# Patient Record
Sex: FEMALE | Race: OTHER | HISPANIC OR LATINO
[De-identification: names, ages, dates, MRNs, and addresses within clinical notes are randomized per-mention and may not be internally consistent; named-entity substitution may affect disease eponyms.]

---

## 2021-04-19 PROBLEM — Z00.00 ENCOUNTER FOR PREVENTIVE HEALTH EXAMINATION: Status: ACTIVE | Noted: 2021-04-19

## 2021-04-29 PROBLEM — Z78.9 NO FAMILY HISTORY OF CANCER: Status: ACTIVE | Noted: 2021-04-29

## 2021-04-29 PROBLEM — Z78.9 RARELY CONSUMES ALCOHOL: Status: ACTIVE | Noted: 2021-04-29

## 2021-04-30 ENCOUNTER — RESULT REVIEW (OUTPATIENT)
Age: 26
End: 2021-04-30

## 2021-04-30 ENCOUNTER — APPOINTMENT (OUTPATIENT)
Dept: HEMATOLOGY ONCOLOGY | Facility: CLINIC | Age: 26
End: 2021-04-30
Payer: COMMERCIAL

## 2021-04-30 VITALS
HEIGHT: 61 IN | DIASTOLIC BLOOD PRESSURE: 86 MMHG | TEMPERATURE: 97.5 F | BODY MASS INDEX: 27.56 KG/M2 | OXYGEN SATURATION: 97 % | HEART RATE: 95 BPM | SYSTOLIC BLOOD PRESSURE: 129 MMHG | RESPIRATION RATE: 18 BRPM | WEIGHT: 146 LBS

## 2021-04-30 DIAGNOSIS — Z78.9 OTHER SPECIFIED HEALTH STATUS: ICD-10-CM

## 2021-04-30 DIAGNOSIS — Z71.89 OTHER SPECIFIED COUNSELING: ICD-10-CM

## 2021-04-30 DIAGNOSIS — U07.1 COVID-19: ICD-10-CM

## 2021-04-30 PROCEDURE — 99215 OFFICE O/P EST HI 40 MIN: CPT

## 2021-04-30 PROCEDURE — 99072 ADDL SUPL MATRL&STAF TM PHE: CPT

## 2021-05-03 PROBLEM — U07.1 COVID-19 VIRUS INFECTION: Status: ACTIVE | Noted: 2021-05-03

## 2021-05-03 PROBLEM — Z71.89 ADVICE GIVEN ABOUT COVID-19 VIRUS INFECTION: Status: ACTIVE | Noted: 2021-05-03

## 2021-05-03 NOTE — HISTORY OF PRESENT ILLNESS
[de-identified] : Mr. Ally Lynn is 25 year old female with no past medical or surgery history, never smoked, not on any contraception, or recent travel  presented to [facility] on 4/14/21 for hemoptysis, worsening of breath, right lower lobe pain (worse with laying down) and found to have right lower lobe PE.\par 4/14/21 CTA - Acute emboli involving lobar and segmental branches at the right lower lobe.\par Consolidation at the right lung base with a small right pleural effusion. Findings concerning for right lower lobe pneumonia. As noted that a component of pulmonary infarction within this region cannot be entirely excluded as the vessel containing emboli does extend to the right lung base.\par \par A few weeks prior to this admission, she's exposed to a positive Covid co-worker with neg Covid PCR at the time. She's found to have positive Covid antibodies and D dimer 509 during this admission. \par \par Patient with no prior pregnancy, no family history of hematological and/or oncological disorder or miscarriages.\par She was started on Lovenox and discharged home with Eliquis 5 mg bid.  [de-identified] : Patient is seen today for follow up\par \par Her breathing is much better\par Still has pleuritic right chest pain, which is getting better\par Hemoptysis resolved now\par \par She is on eliquis 5 mg BID\par LMP 4/25/21- lasted 3 days\par \par Not on Birth control\par \par No family ho blood clots

## 2021-05-03 NOTE — ASSESSMENT
[FreeTextEntry1] : Acute emboli involving lobar and segmental branches at the right lower lobe\par Not on birth control pills at the time of diagnosis\par No prior personal of family ho VTE or miscarriages\par COVID antibody positive\par \par I believe her PE was secondary to COVID 19 infection\par She is on eliquis 5 mg BID and  tolerating well\par CLinically gradually getting better\par Labs reviewed, analyzed and discussed\par D Dimer getting better\par Given her young age at diagnosis, recommend thrombophilia work up\par Discussed about various thrombophilias such as Factor V Leiden mutation, Prothrombin Gene mutation, APS panel, Protein S, protein C, AT3\par To be sent today\par Advised to avoid Birth control pills for now\par Recommend 3-6 months of anticoagulation, depending on how she is feeling/recovering\par Signs, symptoms. precautions for VTE discussed\par \par COVID 19\par Positive AB\par Clinically feels back to normal\par Recommend vaccination when able to\par \par Patient had multiple questions which were answered to satisfaction\par  \par FOllow up in a few weeks to review findings

## 2021-05-20 ENCOUNTER — APPOINTMENT (OUTPATIENT)
Dept: HEMATOLOGY ONCOLOGY | Facility: CLINIC | Age: 26
End: 2021-05-20
Payer: COMMERCIAL

## 2021-05-20 VITALS
OXYGEN SATURATION: 99 % | DIASTOLIC BLOOD PRESSURE: 80 MMHG | RESPIRATION RATE: 18 BRPM | HEART RATE: 70 BPM | BODY MASS INDEX: 27.19 KG/M2 | TEMPERATURE: 98.1 F | HEIGHT: 61 IN | SYSTOLIC BLOOD PRESSURE: 134 MMHG | WEIGHT: 144 LBS

## 2021-05-20 PROCEDURE — 99213 OFFICE O/P EST LOW 20 MIN: CPT

## 2021-05-20 PROCEDURE — 99072 ADDL SUPL MATRL&STAF TM PHE: CPT

## 2021-05-20 NOTE — HISTORY OF PRESENT ILLNESS
[de-identified] : Mr. Ally Lynn is 25 year old female with no past medical or surgery history, never smoked, not on any contraception, or recent travel  presented to [place] [facility] on 4/14/21 for hemoptysis, worsening of breath, right lower lobe pain (worse with laying down) and found to have right lower lobe PE.\par 4/14/21 CTA - Acute emboli involving lobar and segmental branches at the right lower lobe.\par Consolidation at the right lung base with a small right pleural effusion. Findings concerning for right lower lobe pneumonia. As noted that a component of pulmonary infarction within this region cannot be entirely excluded as the vessel containing emboli does extend to the right lung base.\par \par A few weeks prior to this admission, she's exposed to a positive Covid co-worker with neg Covid PCR at the time. She's found to have positive Covid antibodies and D dimer 509 during this admission. \par \par Patient with no prior pregnancy, no family history of hematological and/or oncological disorder or miscarriages.\par She was started on Lovenox and discharged home with Eliquis 5 mg bid.  [de-identified] : Patient is seen today for follow up for PE reports of doing well with Eliquis 5 mg bid, still has minimal pleuritic chest pain with heavy lifting or prolonged exertion. \par Not on Birth control\par No family ho blood clots\par regular monthly menses\par \par Denies SOB/YATES, rash, abnormal bleeding,  b/l lower ext edema.

## 2021-05-20 NOTE — ASSESSMENT
[FreeTextEntry1] : Acute emboli involving lobar and segmental branches at the right lower lobe\par Not on birth control pills at the time of diagnosis\par No prior personal of family ho VTE or miscarriages\par COVID antibody positive\par \par I believe her PE was secondary to COVID 19 infection\par She is on Eliquis 5 mg BID and  tolerating well\par CLinically gradually getting better\par Labs reviewed, analyzed and discussed\par D Dimer getting better\par Given her young age at diagnosis, recommend thrombophilia work up\par April 2021 Thrombophilias such as Factor V Leiden mutation, Prothrombin Gene mutation, Protein S, protein C, AT3 - negative\par Cardiolipin AB screen positive with normal Cardiolipin Ab IgM and IgG.\par PE possibly from Covid (positive AB) \par \par Advised to avoid Birth control pills for now\par Recommend 3 more months of anticoagulant and repeat D dimer, anticardiolipin in three months. \par \par #COVID 19\par Positive AB\par has yet received her Covid vaccine \par \par rtc 3 months for cbc, cmp, d dimer, and cardiolipin ab. \par and 1 week later for office visit

## 2021-07-02 ENCOUNTER — RESULT REVIEW (OUTPATIENT)
Age: 26
End: 2021-07-02

## 2021-07-02 ENCOUNTER — APPOINTMENT (OUTPATIENT)
Dept: HEMATOLOGY ONCOLOGY | Facility: CLINIC | Age: 26
End: 2021-07-02
Payer: COMMERCIAL

## 2021-07-02 VITALS
TEMPERATURE: 98.5 F | DIASTOLIC BLOOD PRESSURE: 79 MMHG | OXYGEN SATURATION: 99 % | WEIGHT: 145 LBS | SYSTOLIC BLOOD PRESSURE: 139 MMHG | HEIGHT: 61 IN | RESPIRATION RATE: 18 BRPM | HEART RATE: 95 BPM | BODY MASS INDEX: 27.38 KG/M2

## 2021-07-02 DIAGNOSIS — L65.9 NONSCARRING HAIR LOSS, UNSPECIFIED: ICD-10-CM

## 2021-07-02 DIAGNOSIS — L70.9 ACNE, UNSPECIFIED: ICD-10-CM

## 2021-07-02 PROCEDURE — 99072 ADDL SUPL MATRL&STAF TM PHE: CPT

## 2021-07-02 PROCEDURE — 99214 OFFICE O/P EST MOD 30 MIN: CPT

## 2021-07-02 NOTE — HISTORY OF PRESENT ILLNESS
[de-identified] : Mr. Ally Lynn is 25 year old female with no past medical or surgery history, never smoked, not on any contraception, or recent travel  presented to [place] [facility] on 4/14/21 for hemoptysis, worsening of breath, right lower lobe pain (worse with laying down) and found to have right lower lobe PE.\par 4/14/21 CTA - Acute emboli involving lobar and segmental branches at the right lower lobe.\par Consolidation at the right lung base with a small right pleural effusion. Findings concerning for right lower lobe pneumonia. As noted that a component of pulmonary infarction within this region cannot be entirely excluded as the vessel containing emboli does extend to the right lung base.\par \par A few weeks prior to this admission, she's exposed to a positive Covid co-worker with neg Covid PCR at the time. She's found to have positive Covid antibodies and D dimer 509 during this admission. \par \par Patient with no prior pregnancy, no family history of hematological and/or oncological disorder or miscarriages.\par She was started on Lovenox and discharged home with Eliquis 5 mg bid.  [de-identified] : Patient is seen today for follow up\par On eliquis BID (4/15/21 - present)\par she recently filled her eliquis and may have 1 month supply\par \par She was admitted to [facility] (5/28-5/31) for headache, sore throat, got worse enough to cause anxiety/panic attack, had fever 102F and was brought to the ED\par CT angio showed resolved PE and improving atelectasis/consolidation\par \par Since the discharge - she has been loosing hair and it is concerning for her. She has started balding in her frontal scalp\par \par

## 2021-07-02 NOTE — RESULTS/DATA
[FreeTextEntry1] : Labs reviewed, analyzed and discussed\par \par D DImer - 509 -> 221\par \par CT angiogram (5/28/21)\par No evidence of acute pulmonary emboli. Previously noted emboli on 4/14/2021 have resolved.\par Residual patchy consolidation/atelectasis at the right lung base which has improved from the prior study of 4/14/2021.

## 2021-07-02 NOTE — ASSESSMENT
[FreeTextEntry1] : Acute emboli involving lobar and segmental branches at the right lower lobe\par Likely provoked by COVID infection\par Not on birth control pills at the time of diagnosis\par No prior personal of family ho VTE or miscarriages\par COVID antibody positive\par Thrombophilia work up negative\par \par She is on Eliquis 5 mg BID (4/15/21 - present) and  tolerating well\par D DImer negative and recent CT  (5/28) show resolution of PE\par From PE stand point, she is doing better\par I am not convinced that he recent ED visit was related to PE or pneumonia\par ?? viral URTI\par Feels better\par Advised to complete current supply of eliquis and then discontinue\par She would have taken it for ~4 months\par Advised to avoid Birth control pills for now\par Anticardiolipin screen positive - which could be related to her recent COVID infection vs acute PE\par Repeat sent today- do not anticipate it to be positive\par Patient will call to discuss findings/results in a few days\par \par Hair loss\par Acne\par Refer to endocrinology\par \par Labs in 4-5 months \par cbc, cmp, d dimer, iron studies, ferritin\par OV few days later\par

## 2021-08-19 ENCOUNTER — APPOINTMENT (OUTPATIENT)
Dept: HEMATOLOGY ONCOLOGY | Facility: CLINIC | Age: 26
End: 2021-08-19

## 2021-08-26 ENCOUNTER — APPOINTMENT (OUTPATIENT)
Dept: HEMATOLOGY ONCOLOGY | Facility: CLINIC | Age: 26
End: 2021-08-26

## 2021-10-18 ENCOUNTER — APPOINTMENT (OUTPATIENT)
Dept: ENDOCRINOLOGY | Facility: CLINIC | Age: 26
End: 2021-10-18

## 2021-12-22 ENCOUNTER — RESULT REVIEW (OUTPATIENT)
Age: 26
End: 2021-12-22

## 2021-12-22 ENCOUNTER — APPOINTMENT (OUTPATIENT)
Dept: HEMATOLOGY ONCOLOGY | Facility: CLINIC | Age: 26
End: 2021-12-22

## 2021-12-22 VITALS
SYSTOLIC BLOOD PRESSURE: 123 MMHG | OXYGEN SATURATION: 100 % | DIASTOLIC BLOOD PRESSURE: 80 MMHG | HEART RATE: 70 BPM | BODY MASS INDEX: 26.24 KG/M2 | HEIGHT: 61.02 IN | RESPIRATION RATE: 16 BRPM | WEIGHT: 139 LBS | TEMPERATURE: 98.7 F

## 2022-01-05 ENCOUNTER — TRANSCRIPTION ENCOUNTER (OUTPATIENT)
Age: 27
End: 2022-01-05

## 2022-01-11 ENCOUNTER — RESULT REVIEW (OUTPATIENT)
Age: 27
End: 2022-01-11

## 2022-01-11 ENCOUNTER — APPOINTMENT (OUTPATIENT)
Dept: HEMATOLOGY ONCOLOGY | Facility: CLINIC | Age: 27
End: 2022-01-11

## 2022-01-11 VITALS
RESPIRATION RATE: 16 BRPM | SYSTOLIC BLOOD PRESSURE: 123 MMHG | OXYGEN SATURATION: 99 % | HEART RATE: 74 BPM | WEIGHT: 141 LBS | TEMPERATURE: 98.5 F | HEIGHT: 61.02 IN | DIASTOLIC BLOOD PRESSURE: 79 MMHG | BODY MASS INDEX: 26.62 KG/M2

## 2022-01-18 ENCOUNTER — APPOINTMENT (OUTPATIENT)
Dept: HEMATOLOGY ONCOLOGY | Facility: CLINIC | Age: 27
End: 2022-01-18
Payer: COMMERCIAL

## 2022-02-15 ENCOUNTER — APPOINTMENT (OUTPATIENT)
Dept: HEMATOLOGY ONCOLOGY | Facility: CLINIC | Age: 27
End: 2022-02-15
Payer: COMMERCIAL

## 2022-03-08 ENCOUNTER — RESULT REVIEW (OUTPATIENT)
Age: 27
End: 2022-03-08

## 2022-03-08 ENCOUNTER — APPOINTMENT (OUTPATIENT)
Dept: HEMATOLOGY ONCOLOGY | Facility: CLINIC | Age: 27
End: 2022-03-08
Payer: COMMERCIAL

## 2022-03-08 VITALS
RESPIRATION RATE: 1 BRPM | HEART RATE: 78 BPM | WEIGHT: 141 LBS | DIASTOLIC BLOOD PRESSURE: 82 MMHG | HEIGHT: 61.02 IN | SYSTOLIC BLOOD PRESSURE: 130 MMHG | TEMPERATURE: 99 F | OXYGEN SATURATION: 100 % | BODY MASS INDEX: 26.62 KG/M2

## 2022-03-08 DIAGNOSIS — D50.9 IRON DEFICIENCY ANEMIA, UNSPECIFIED: ICD-10-CM

## 2022-03-08 PROCEDURE — 99213 OFFICE O/P EST LOW 20 MIN: CPT

## 2022-03-08 PROCEDURE — 36415 COLL VENOUS BLD VENIPUNCTURE: CPT

## 2022-03-08 RX ORDER — APIXABAN 5 MG/1
5 TABLET, FILM COATED ORAL
Qty: 60 | Refills: 2 | Status: DISCONTINUED | COMMUNITY
End: 2022-03-08

## 2022-03-08 NOTE — HISTORY OF PRESENT ILLNESS
[de-identified] : Mr. Ally Lynn is 25 year old female with no past medical or surgery history, never smoked, not on any contraception, or recent travel  presented to [place] [facility] on 4/14/21 for hemoptysis, worsening of breath, right lower lobe pain (worse with laying down) and found to have right lower lobe PE.\par 4/14/21 CTA - Acute emboli involving lobar and segmental branches at the right lower lobe.\par Consolidation at the right lung base with a small right pleural effusion. Findings concerning for right lower lobe pneumonia. As noted that a component of pulmonary infarction within this region cannot be entirely excluded as the vessel containing emboli does extend to the right lung base.\par \par A few weeks prior to this admission, she's exposed to a positive Covid co-worker with neg Covid PCR at the time. She's found to have positive Covid antibodies and D dimer 509 during this admission. \par \par Patient with no prior pregnancy, no family history of hematological and/or oncological disorder or miscarriages.\par She was started on Lovenox and discharged home with Eliquis 5 mg bid. \par \par \par She was admitted to [place] [facility] (5/28-5/31) for headache, sore throat, got worse enough to cause anxiety/panic attack, had fever 102F and was brought to the ED\par CT angio showed resolved PE and improving atelectasis/consolidation [de-identified] : Patient is seen today for follow up\par On eliquis BID (4/15/21 -  August 2021t)\par \par Patient reports of having heavier menses since her Covid infections. Her menses lasted about 5 days, LMP mid of Feb 2022.\par Overall, doing well with no complaints. \par \par \par Since the discharge - she has been loosing hair and it is concerning for her. She has started balding in her frontal scalp\par \par

## 2022-03-08 NOTE — RESULTS/DATA
[FreeTextEntry1] : Labs reviewed, analyzed and discussed\par \par D DImer - 509 -> WNLs\par \par CT angiogram (5/28/21)\par No evidence of acute pulmonary emboli. Previously noted emboli on 4/14/2021 have resolved.\par Residual patchy consolidation/atelectasis at the right lung base which has improved from the prior study of 4/14/2021.

## 2022-03-08 NOTE — ASSESSMENT
[FreeTextEntry1] : Acute emboli involving lobar and segmental branches at the right lower lobe\par Likely provoked by COVID infection\par Not on birth control pills at the time of diagnosis\par No prior personal of family ho VTE or miscarriages\par COVID antibody positive\par Thrombophilia work up negative\par \par She is on Eliquis 5 mg BID (4/15/21 - August 2021) \par D DImer negative and recent CT  (5/28) show resolution of PE\par clinically doing well with no signs and symptoms of VTEs\par Advised to continue avoiding Birth control pills for now\par H/H stable but ferritin dropped form 85 to 13 - heavier menses since her Covid infection 3/2021\par repeated Ferritin - if persistently low - will advised on Iron tablet or IV Venofer 200 mg x 5. \par \par d/w Dr. Lay \par Labs in 4-5 months \par cbc, cmp, d dimer, iron studies, ferritin\par OV few days later\par

## 2022-05-31 DIAGNOSIS — I26.99 OTHER PULMONARY EMBOLISM W/OUT ACUTE COR PULMONALE: ICD-10-CM

## 2022-06-02 NOTE — HISTORY OF PRESENT ILLNESS
[de-identified] : Mr. Ally Lynn is 25 year old female with no past medical or surgery history, never smoked, not on any contraception, or recent travel  presented to [place] [facility] on 4/14/21 for hemoptysis, worsening of breath, right lower lobe pain (worse with laying down) and found to have right lower lobe PE.\par 4/14/21 CTA - Acute emboli involving lobar and segmental branches at the right lower lobe.\par Consolidation at the right lung base with a small right pleural effusion. Findings concerning for right lower lobe pneumonia. As noted that a component of pulmonary infarction within this region cannot be entirely excluded as the vessel containing emboli does extend to the right lung base.\par \par A few weeks prior to this admission, she's exposed to a positive Covid co-worker with neg Covid PCR at the time. She's found to have positive Covid antibodies and D dimer 509 during this admission. \par \par Patient with no prior pregnancy, no family history of hematological and/or oncological disorder or miscarriages.\par She was started on Lovenox and discharged home with Eliquis 5 mg bid. \par \par \par She was admitted to [place] [facility] (5/28-5/31) for headache, sore throat, got worse enough to cause anxiety/panic attack, had fever 102F and was brought to the ED\par CT angio showed resolved PE and improving atelectasis/consolidation [de-identified] : Patient is seen today for follow up\par On eliquis BID (4/15/21 -  August 2021t)\par \par Patient reports of having heavier menses since her Covid infections. Her menses lasted about 5 days, LMP mid of Feb 2022.\par Overall, doing well with no complaints. \par \par \par Since the discharge - she has been loosing hair and it is concerning for her. She has started balding in her frontal scalp\par \par

## 2022-06-07 ENCOUNTER — APPOINTMENT (OUTPATIENT)
Dept: HEMATOLOGY ONCOLOGY | Facility: CLINIC | Age: 27
End: 2022-06-07

## 2022-06-07 ENCOUNTER — NON-APPOINTMENT (OUTPATIENT)
Age: 27
End: 2022-06-07